# Patient Record
Sex: FEMALE | Race: WHITE | NOT HISPANIC OR LATINO | URBAN - METROPOLITAN AREA
[De-identification: names, ages, dates, MRNs, and addresses within clinical notes are randomized per-mention and may not be internally consistent; named-entity substitution may affect disease eponyms.]

---
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
## 2022-10-20 NOTE — ED ADULT TRIAGE NOTE - BP NONINVASIVE SYSTOLIC (MM HG)
Encounter Date: 10/20/2022       History     Chief Complaint   Patient presents with    Cough     PT SEEN YESTERDAY W SAME CO COUGH, BODY ACHES AND SINUS CONGESTION X 4 DAYS.  STATES RX MEDS NOT HELPING.  PT NEG FOR FLU AND COVID.       Patient with no known pmhx presents today c/o of rhinorrhea, cough, and bodyaches for 3 days. She was seen at another ED yesterday and tested for covid/flu which was negative. She was prescribed astelin spray and tessalon perles. Patient says it is not helping.     The history is provided by the patient. No  was used.   Cough  The current episode started two days ago. The problem occurs constantly. The problem has been unchanged. The cough is Non-productive. There has been no fever. Associated symptoms include rhinorrhea and myalgias. Pertinent negatives include no chest pain, no sweats, no weight loss, no ear congestion, no ear pain, no headaches, no sore throat, no shortness of breath, no wheezing and no eye redness. Treatments tried: astelin and tessalon perles. The treatment provided no relief. She is not a smoker. Her past medical history does not include bronchitis, pneumonia, bronchiectasis, COPD, emphysema or asthma.   Review of patient's allergies indicates:  No Known Allergies  No past medical history on file.  No past surgical history on file.  No family history on file.  Social History     Tobacco Use    Smoking status: Never    Smokeless tobacco: Never     Review of Systems   Constitutional:  Negative for fever and weight loss.   HENT:  Positive for congestion and rhinorrhea. Negative for ear pain, postnasal drip, sinus pressure, sinus pain and sore throat.    Eyes:  Negative for redness.   Respiratory:  Positive for cough. Negative for apnea, choking, chest tightness, shortness of breath, wheezing and stridor.    Cardiovascular:  Negative for chest pain, palpitations and leg swelling.   Gastrointestinal:  Negative for abdominal pain, constipation,  diarrhea, nausea and vomiting.   Genitourinary:  Negative for dysuria, flank pain and hematuria.   Musculoskeletal:  Positive for arthralgias and myalgias.   Skin:  Negative for rash.   Neurological:  Negative for syncope, light-headedness, numbness and headaches.     Physical Exam     Initial Vitals [10/20/22 1153]   BP Pulse Resp Temp SpO2   138/89 86 16 97.9 °F (36.6 °C) 100 %      MAP       --         Physical Exam    Vitals reviewed.  Constitutional: She appears well-developed and well-nourished. She is not diaphoretic. No distress.   HENT:   Head: Normocephalic and atraumatic.   Mouth/Throat: Oropharynx is clear and moist. No oropharyngeal exudate.   Eyes: Conjunctivae and EOM are normal. Pupils are equal, round, and reactive to light.   Neck: Neck supple.   Normal range of motion.  Cardiovascular:  Normal rate, regular rhythm, normal heart sounds and intact distal pulses.           Pulmonary/Chest: Breath sounds normal. No respiratory distress.   Abdominal: Abdomen is soft. Bowel sounds are normal. She exhibits no distension. There is no abdominal tenderness. There is no rebound.   Musculoskeletal:         General: No edema.      Cervical back: Normal range of motion and neck supple.     Neurological: She is alert and oriented to person, place, and time.   Skin: Skin is warm and dry. Capillary refill takes less than 2 seconds.   Psychiatric: She has a normal mood and affect.       ED Course   Procedures  Labs Reviewed   POCT URINE PREGNANCY          Imaging Results    None          Medications - No data to display        APC / Resident Notes:   I was not physically present during the history, exam or disposition of this patient. I was available at all times for consultation. (Zmora)               Clinical Impression:   Final diagnoses:  [J06.9] Upper respiratory tract infection, unspecified type (Primary)      ED Disposition Condition    Discharge Stable          ED Prescriptions       Medication Sig Dispense  Start Date End Date Auth. Provider    promethazine-dextromethorphan (PROMETHAZINE-DM) 6.25-15 mg/5 mL Syrp Take 5 mLs by mouth every 6 (six) hours as needed (cough). 118 mL 10/20/2022 -- XUAN Ace          Follow-up Information       Follow up With Specialties Details Why Contact Info    [facility] Emergency Medicine  If symptoms worsen return to ED immediately [address] [facility] [address] [phone]    Primary Care Provider within 1-2 days  Go in 1 day               XUAN Ace  10/20/22 1216       Aston Mcnally MD  10/20/22 2602     112